# Patient Record
Sex: MALE | Race: WHITE | NOT HISPANIC OR LATINO | URBAN - METROPOLITAN AREA
[De-identification: names, ages, dates, MRNs, and addresses within clinical notes are randomized per-mention and may not be internally consistent; named-entity substitution may affect disease eponyms.]

---

## 2019-01-01 ENCOUNTER — OFFICE VISIT (OUTPATIENT)
Dept: URBAN - METROPOLITAN AREA CLINIC 33 | Facility: CLINIC | Age: 39
End: 2019-01-01
Payer: COMMERCIAL

## 2019-01-01 PROCEDURE — 99213 OFFICE O/P EST LOW 20 MIN: CPT | Performed by: OPHTHALMOLOGY

## 2019-01-01 ASSESSMENT — INTRAOCULAR PRESSURE
OS: 9
OD: 13

## 2019-01-28 ENCOUNTER — OFFICE VISIT (OUTPATIENT)
Dept: URBAN - METROPOLITAN AREA CLINIC 32 | Facility: CLINIC | Age: 39
End: 2019-01-28
Payer: COMMERCIAL

## 2019-01-28 PROCEDURE — 99213 OFFICE O/P EST LOW 20 MIN: CPT | Performed by: OPHTHALMOLOGY

## 2019-01-28 ASSESSMENT — INTRAOCULAR PRESSURE
OS: 9
OD: 9

## 2019-06-18 ENCOUNTER — OFFICE VISIT (OUTPATIENT)
Dept: URBAN - METROPOLITAN AREA CLINIC 33 | Facility: CLINIC | Age: 39
End: 2019-06-18
Payer: COMMERCIAL

## 2019-06-18 PROCEDURE — 99213 OFFICE O/P EST LOW 20 MIN: CPT | Performed by: OPHTHALMOLOGY

## 2019-06-18 ASSESSMENT — INTRAOCULAR PRESSURE
OD: 12
OS: 12

## 2019-06-18 NOTE — IMPRESSION/PLAN
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 Symptoms: will treat with meds. Plan: Discussed diagnosis in detail with patient. Discussed treatment options with patient. Continue Cystaran to Q2H OU. Stressed compliance. Observe. Call if symptoms worsen.

## 2020-01-01 ENCOUNTER — OFFICE VISIT (OUTPATIENT)
Dept: URBAN - METROPOLITAN AREA CLINIC 33 | Facility: CLINIC | Age: 40
End: 2020-01-01
Payer: COMMERCIAL

## 2020-01-01 DIAGNOSIS — E72.04 CYSTINOSIS: Primary | ICD-10-CM

## 2020-01-01 PROCEDURE — 99213 OFFICE O/P EST LOW 20 MIN: CPT | Performed by: OPHTHALMOLOGY

## 2020-01-01 RX ORDER — CYSTEAMINE HYDROCHLORIDE 6.5 MG/ML
0.44 % SOLUTION OPHTHALMIC
Qty: 15 | Refills: 12 | Status: INACTIVE
Start: 2020-01-01 | End: 2020-01-01

## 2020-01-01 ASSESSMENT — INTRAOCULAR PRESSURE
OD: 12
OS: 10

## 2020-05-19 NOTE — IMPRESSION/PLAN
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 Symptoms: will treat with meds. Plan: Discussed diagnosis in detail with patient. Discussed treatment options with patient. Decrease Cystaran to Q4H OU. Stressed compliance. Observe. Call if symptoms worsen.

## 2024-02-09 NOTE — IMPRESSION/PLAN
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 Symptoms: will treat with meds. Plan: Discussed diagnosis in detail with patient. Discussed treatment options with patient. Continue Cystaran to Q2H OU. Stressed compliance. Observe. Call if symptoms worsen. Ilana